# Patient Record
Sex: FEMALE | Race: WHITE | NOT HISPANIC OR LATINO | Employment: PART TIME | URBAN - METROPOLITAN AREA
[De-identification: names, ages, dates, MRNs, and addresses within clinical notes are randomized per-mention and may not be internally consistent; named-entity substitution may affect disease eponyms.]

---

## 2017-08-11 ENCOUNTER — OFFICE VISIT (OUTPATIENT)
Dept: INTERNAL MEDICINE | Facility: MEDICAL CENTER | Age: 27
End: 2017-08-11
Payer: COMMERCIAL

## 2017-08-11 VITALS
DIASTOLIC BLOOD PRESSURE: 73 MMHG | BODY MASS INDEX: 26.98 KG/M2 | HEIGHT: 64 IN | SYSTOLIC BLOOD PRESSURE: 109 MMHG | WEIGHT: 158 LBS | HEART RATE: 66 BPM | TEMPERATURE: 98.8 F | OXYGEN SATURATION: 95 %

## 2017-08-11 DIAGNOSIS — F06.4 ANXIETY DISORDER DUE TO GENERAL MEDICAL CONDITION: ICD-10-CM

## 2017-08-11 DIAGNOSIS — K21.9 GASTROESOPHAGEAL REFLUX DISEASE WITHOUT ESOPHAGITIS: ICD-10-CM

## 2017-08-11 DIAGNOSIS — Z00.00 HEALTHCARE MAINTENANCE: ICD-10-CM

## 2017-08-11 DIAGNOSIS — E05.90 HYPERTHYROIDISM: ICD-10-CM

## 2017-08-11 PROCEDURE — 99203 OFFICE O/P NEW LOW 30 MIN: CPT | Mod: GE | Performed by: INTERNAL MEDICINE

## 2017-08-11 RX ORDER — OMEPRAZOLE 20 MG/1
20 CAPSULE, DELAYED RELEASE ORAL DAILY
COMMUNITY
End: 2018-03-05

## 2017-08-11 RX ORDER — ESCITALOPRAM OXALATE 10 MG/1
10 TABLET ORAL DAILY
COMMUNITY
End: 2018-05-10

## 2017-08-11 RX ORDER — METHIMAZOLE 5 MG/1
10 TABLET ORAL DAILY
COMMUNITY
End: 2018-05-10

## 2017-08-11 RX ORDER — ALPRAZOLAM 0.25 MG/1
0.25 TABLET ORAL 2 TIMES DAILY
Status: CANCELLED | OUTPATIENT
Start: 2017-08-11

## 2017-08-11 RX ORDER — ALPRAZOLAM 0.25 MG/1
0.25 TABLET ORAL NIGHTLY PRN
COMMUNITY
End: 2018-05-10

## 2017-08-11 ASSESSMENT — PATIENT HEALTH QUESTIONNAIRE - PHQ9: CLINICAL INTERPRETATION OF PHQ2 SCORE: 0

## 2017-08-11 ASSESSMENT — PAIN SCALES - GENERAL: PAINLEVEL: NO PAIN

## 2017-08-11 NOTE — MR AVS SNAPSHOT
"        Alcira Encarnacion   2017 8:00 AM   Office Visit   MRN: [id number]    Department:  [facility] Med - Internal Med   Dept Phone:  [phone]    Description:  Female : 1990   Provider:  Hanny Kendrick M.D.           Reason for Visit     New Patient hyperthyroidism       Allergies as of 2017     No Known Allergies      You were diagnosed with     Hyperthyroidism   [654302]       Gastroesophageal reflux disease without esophagitis   [151522]       Anxiety disorder due to general medical condition   [2823720]         Vital Signs     Blood Pressure Pulse Temperature Height Weight Body Mass Index    109/73 mmHg 66 37.1 °C (98.8 °F) 1.626 m (5' 4.02\") 71.668 kg (158 lb) 27.11 kg/m2    Oxygen Saturation Last Menstrual Period Breastfeeding? Smoking Status          95% 2017 No Never Smoker         Basic Information     Date Of Birth Sex Race Ethnicity Preferred Language    1990 Female Unable to Obtain Unknown English      Your appointments     2018  8:00 AM   Established Patient with Hanny Kendrick M.D.   [facility] Med - Internal Medicine (--)    [address] [id number] [phone]           You will be receiving a confirmation call a few days before your appointment from our automated call confirmation system.              Problem List              ICD-10-CM Priority Class Noted - Resolved    Hyperthyroidism E05.90   2017 - Present    Acid reflux K21.9   2017 - Present      Health Maintenance     Patient has no pending health maintenance at this time      Current Immunizations     No immunizations on file.      Below and/or attached are the medications your provider expects you to take. Review all of your home medications and newly ordered medications with your provider and/or pharmacist. Follow medication instructions as directed by your provider and/or pharmacist. Please keep your medication list with you and share with your " provider. Update the information when medications are discontinued, doses are changed, or new medications (including over-the-counter products) are added; and carry medication information at all times in the event of emergency situations     Allergies:  No Known Allergies          Medications  Valid as of: August 11, 2017 -  9:13 AM    Generic Name Brand Name Tablet Size Instructions for use    ALPRAZolam (Tab) XANAX 0.25 MG Take 0.25 mg by mouth at bedtime as needed for Sleep.        Escitalopram Oxalate (Tab) LEXAPRO 10 MG Take 10 mg by mouth every day.        MethIMAzole (Tab) TAPAZOLE 10 MG Take 10 mg by mouth every day.        Omeprazole (CAPSULE DELAYED RELEASE) PRILOSEC 20 MG Take 20 mg by mouth every day.        .                 Medicines prescribed today were sent to:     [facility] [address], [place] - [address] [facility]    [address] [facility] [address]    Phone: [phone] Fax: [phone]    Open 24 Hours?: No      Medication refill instructions:       If your prescription bottle indicates you have medication refills left, it is not necessary to call your provider’s office. Please contact your pharmacy and they will refill your medication.    If your prescription bottle indicates you do not have any refills left, you may request refills at any time through one of the following ways: The online [employer] system (except Urgent Care), by calling your provider’s office, or by asking your pharmacy to contact your provider’s office with a refill request. Medication refills are processed only during regular business hours and may not be available until the next business day. Your provider may request additional information or to have a follow-up visit with you prior to refilling your medication.   *Please Note: Medication refills are assigned a new Rx number when refilled electronically. Your pharmacy may indicate that no refills were authorized even though a  new prescription for the same medication is available at the pharmacy. Please request the medicine by name with the pharmacy before contacting your provider for a refill.        Your To Do List     Future Labs/Procedures Complete By Expires    CBC WITH DIFFERENTIAL  As directed 8/11/2018    COMP METABOLIC PANEL  As directed 8/11/2018    HEPATIC FUNCTION PANEL  As directed 8/11/2018      Referral     A referral request has been sent to our patient care coordination department. Please allow 3-5 business days for us to process this request and contact you either by phone or mail. If you do not hear from us by the 5th business day, please call us at [phone].        Instructions    Follow up in 3 months.           [employer] Access Code: Activation code not generated  Current [employer] Status: Active

## 2017-08-11 NOTE — PROGRESS NOTES
"New Patient to Establish    Reason to establish: New patient to establish    CC: establish care.    HPI: Ms. Encarnacion is a 26 year old lady with PMHx significant for hyperthyroidism (type unknown) with secondary anxiety, and gastroesophageal reflux who presents today to establish care.  Patient has recently changed health insurance coverage and her new health insurance does not cover her previous PCP or endocrinologist.      Hyperthyroidism: Patient was diagnosed with hyperthyroidism in May 2016.  Was being followed by Dr. Erick Paige, Endocrinology, in [place] prior to change in health insurance.  Patient states her hyperthyroidism is well controlled on methimazole 10 mg daily.  Endorses occasional palpitations, unchanged.     Anxiety: Patient also experiences anxiety secondary to hyperthyroidism, for which she was followed by Dr. Paige and treated with escitalopram 10 mg daily and alprazolam 0.25 mg twice daily.  Patient reports anxiety is fairly well controlled except for when she abruptly discontinued the escitalopram.  She has since resumed it.      Gastroesophageal reflux: Patient states she had a long h/o \"abdominal pain and acid reflux\" after eating, which finally resolved when she was placed on omeprazole 20 mg daily five years ago.  She has since continued to take it with minimal symptoms.     Patient Active Problem List    Diagnosis Date Noted   • Hyperthyroidism 08/11/2017   • Acid reflux 08/11/2017       No past medical history on file.    Current Outpatient Prescriptions   Medication Sig Dispense Refill   • escitalopram (LEXAPRO) 10 MG Tab Take 10 mg by mouth every day.     • methimazole (TAPAZOLE) 10 MG Tab Take 10 mg by mouth every day.     • omeprazole (PRILOSEC) 20 MG delayed-release capsule Take 20 mg by mouth every day.     • alprazolam (XANAX) 0.25 MG Tab Take 0.25 mg by mouth at bedtime as needed for Sleep.       No current facility-administered medications for this visit.       Allergies " "as of 08/11/2017   • (No Known Allergies)       Social History     Social History   • Marital Status: Unknown     Spouse Name: N/A   • Number of Children: N/A   • Years of Education: N/A     Occupational History   • Not on file.     Social History Main Topics   • Smoking status: Never Smoker    • Smokeless tobacco: Never Used   • Alcohol Use: Yes      Comment: occasional    • Drug Use: No   • Sexual Activity: Not on file     Other Topics Concern   • Not on file     Social History Narrative   • No narrative on file       No family history on file.    No past surgical history on file.    ROS: 14 point review of systems negative except for below:    Constitutional:  Positive for 30 lb. weight gain since May 2016 after she began taking thyroid medication.    CV: Positive for occasional palpitations.   GI:  Positive for heartburn after she eats spicy foods and is not taking omeprazole.    Psych: Positive for occasional anxiety.        /73 mmHg  Pulse 66  Temp(Src) 37.1 °C (98.8 °F)  Ht 1.626 m (5' 4.02\")  Wt 71.668 kg (158 lb)  BMI 27.11 kg/m2  SpO2 95%  LMP 07/11/2017  Breastfeeding? No    Physical Exam  General:  Sitting in exam room in no apparent distress.  Alert and oriented to person, place, time, and situation.      Eyes: Pupils equal and reactive. No scleral icterus.    Throat: Clear no erythema or exudates noted.    Neck: Supple. No lymphadenopathy noted. Thyroid not enlarged.    Lungs: Clear to auscultation bilaterally.    Cardiovascular: Regular rate and rhythm. No murmurs, rubs or gallops.    Abdomen:  Soft, non-tender, non-distended.  No rebound or guarding noted.    Extremities: No clubbing, cyanosis, edema.    Skin: Clear. No rash or suspicious skin lesions noted.      Note: I have reviewed all pertinent labs and diagnostic tests associated with this visit with specific comments listed under the assessment and plan below    Assessment and Plan    1. Hyperthyroidism  - Diagnosed May 2016.  " "Previously followed by Dr. Erick Paige, Endocrinology, [place].  Needs new endocrinologist due to change in insurance.    - Patient currently asymptomatic except for occasional palpitations.    - Continue home medication: methimazole 10 mg daily.    - Labs: checking TSH/free T4, CBC w/diff, CMP, LFTs.    - Given referral to Endocrinology.      2. Anxiety disorder due to general medical condition  - Patient taking escitalopram 10 mg daily for baseline control of anxiety.    - Patient was also taking alprazolam 0.25 mg twice daily for anxiety.  Discussed with patient difference between long-acting/baseline control of anxiety with escitalopram and quick/short-acting alprazolam.  Advised patient alprazolam should be used on PRN basis rather than scheduled.  Patient agreed and \"[is] glad to not have to take one more pill.\"      3. Gastroesophageal reflux disease without esophagitis  - Patient reports years long history of abdominal pain and acid reflux, which resolved when she was started on omeprazole 20 mg by her PCP five years ago.  Denies work up for these symptoms.   - Discussed with patient possible long term effects of omeprazole including malabsorption, osteoporosis, and infections.  Patient agreeable to seeing a GI physician in the future for formal assessment for GERD and need for omeprazole.    4. Healthcare maintenance  - Patient's last Pap smear was two years ago.  Unsure if Pap or Pap/HPV.  Next screening in 1 year if Pap/HPV not confirmed.     Followup: Return in about 6 months (around 2/11/2018).    Signed by: Hanny Kendrick M.D.  "

## 2017-11-10 ENCOUNTER — APPOINTMENT (OUTPATIENT)
Dept: ENDOCRINOLOGY | Facility: MEDICAL CENTER | Age: 27
End: 2017-11-10

## 2018-03-05 ENCOUNTER — OFFICE VISIT (OUTPATIENT)
Dept: INTERNAL MEDICINE | Facility: MEDICAL CENTER | Age: 28
End: 2018-03-05
Payer: COMMERCIAL

## 2018-03-05 VITALS
DIASTOLIC BLOOD PRESSURE: 70 MMHG | SYSTOLIC BLOOD PRESSURE: 112 MMHG | HEART RATE: 87 BPM | BODY MASS INDEX: 27.45 KG/M2 | OXYGEN SATURATION: 93 % | WEIGHT: 160.8 LBS | TEMPERATURE: 98.4 F | HEIGHT: 64 IN

## 2018-03-05 DIAGNOSIS — F06.4 ANXIETY DISORDER DUE TO GENERAL MEDICAL CONDITION: ICD-10-CM

## 2018-03-05 DIAGNOSIS — K21.9 GASTROESOPHAGEAL REFLUX DISEASE WITHOUT ESOPHAGITIS: ICD-10-CM

## 2018-03-05 DIAGNOSIS — E05.90 HYPERTHYROIDISM: ICD-10-CM

## 2018-03-05 PROCEDURE — 99214 OFFICE O/P EST MOD 30 MIN: CPT | Mod: GC | Performed by: HOSPITALIST

## 2018-03-05 RX ORDER — RANITIDINE HCL 75 MG
75 TABLET ORAL 2 TIMES DAILY
Qty: 60 TAB | Refills: 3 | Status: SHIPPED | OUTPATIENT
Start: 2018-03-05 | End: 2018-05-10

## 2018-03-05 ASSESSMENT — PAIN SCALES - GENERAL: PAINLEVEL: NO PAIN

## 2018-03-05 NOTE — PROGRESS NOTES
Established Patient    Alcira presents today with the following:    CC: 6 month follow up.     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    Hyperthyroidism: Patient is followed by Dr. Vargas, Endocrinology. She states her methimazole was decreased from 10 mg to 5 mg recently. She had an appointment with Endocrinology last week during which she was told she may have had a slightly enlarged thyroid on visit. She reports Endocrinology will repeat her blood work in 6 weeks and will consider need for ultrasound. Denies symptoms of hyperthyroidism including palpitations, heat intolerance, increased anxiety, weight loss.    Anxiety: Patient states her anxiety, which is generally secondary to her hyperthyroidism, is doing well overall. She has a prescription for Xanax 0.25 mg nightly PRN, but states she only takes it 1-2 times per week. She is also on escitalopram 10 mg daily.    Gastroesophageal reflux: She states she has had zero symptoms of reflux in the past 6 months. She is agreeable to de-escalating her reflux medication to determine if it is still needed.        Patient Active Problem List    Diagnosis Date Noted   • Hyperthyroidism 08/11/2017   • Acid reflux 08/11/2017   • Anxiety disorder due to general medical condition 08/11/2017       Current Outpatient Prescriptions   Medication Sig Dispense Refill   • ranitidine (ZANTAC) 75 MG tablet Take 1 Tab by mouth 2 times a day. 60 Tab 3   • escitalopram (LEXAPRO) 10 MG Tab Take 10 mg by mouth every day.     • methimazole (TAPAZOLE) 5 MG Tab Take 10 mg by mouth every day.     • alprazolam (XANAX) 0.25 MG Tab Take 0.25 mg by mouth at bedtime as needed for Sleep.       No current facility-administered medications for this visit.        ROS: 12 point review of systems negative except as in HPI and  "below.     Psych: Positive for occasional anxiety.      /70   Pulse 87   Temp 36.9 °C (98.4 °F)   Ht 1.626 m (5' 4\")   Wt 72.9 kg (160 lb 12.8 oz)   LMP 02/27/2018   SpO2 93%   Breastfeeding? No   BMI 27.60 kg/m²     Physical Exam   Constitutional: Sitting in exam room chair, oriented to person, place, and time. No distress.   Eyes: Pupils are equal, round, and reactive to light. No scleral icterus.  Neck: Neck supple. Possible mild thyromegaly, no nodules palpated.  Cardiovascular: Normal rate, regular rhythm and normal heart sounds.  Exam reveals no gallop and no friction rub.  No murmur heard.  Pulmonary/Chest: Breath sounds normal. Chest wall is not dull to percussion.   Abdominal: Soft, nontender, nondistended. Bowel sounds present.  Musculoskeletal:   no edema.   Lymphadenopathy: no cervical adenopathy  Neurological: alert and oriented to person, place, and time.   Skin: No cyanosis. Nails show no clubbing.    PPI:  Reason for use? Dyspepsia and heartburn symptoms.   How long has patient been on it? 5.5 years.   Last time patient was off? Did symptoms resolve? Has not stopped since starting.  Alternative prescriptions attempted? None.  H. Pylori checked? Seen by GI? EGD in past? No/no/no.      Note: I have reviewed all pertinent labs and diagnostic tests associated with this visit with specific comments listed under the assessment and plan below    Assessment and Plan    1. Hyperthyroidism  2. Anxiety disorder due to general medical condition  - Followed by Dr. Vargas, Endocrinology.  Methimazole was decreased from 10 mg to 5 mg recently.  Had an Endocrinology appointment last week during which she was told she may have a slightly enlarged thyroid on visit.  Endocrinology will repeat her blood work in 6 weeks and will consider need for ultrasound.  Denies symptoms of hyperthyroidism including palpitations, heat intolerance, increased anxiety, weight loss.  - Patient's anxiety, which is generally " secondary to her hyperthyroidism, is doing well overall. She has a prescription for Xanax 0.25 mg nightly PRN, but states she only takes it 1-2 times per week.  She is also on escitalopram 10 mg daily.  - Does not appear to have recent LFTs, CMP, CBC. Was supposed to check 6 months ago, but the patient did not go.  Re-ordered.  Follow up.   - Patient to continue weaning off Xanax. She does not want to be dependent on it and is agreeable to trying to use it 0-1x/week.      3. Gastroesophageal reflux disease without esophagitis  -  Has been on omeprazole for follow-up in 5 years. She has had zero symptoms of reflux in the past 6 months.  She is agreeable to deescalation of reflux medication to determine if it is still needed.  - Change omeprazole to ranitidine 75 mg twice daily.    4. Healthcare maintenance  - Patient believes she is up to date on her immunizations. She received them in [place]. She will obtain these records and send them into the office.  - Patient's last Pap smear was 2 years ago. Next Pap smear in 2019.      Followup: Return in about 1 year (around 3/5/2019).      Signed by: Hanny Kendrick M.D.

## 2018-03-20 LAB
ALBUMIN SERPL-MCNC: 4.1 G/DL (ref 3.5–5.5)
ALBUMIN/GLOB SERPL: 1.5 {RATIO} (ref 1.2–2.2)
ALP SERPL-CCNC: 98 IU/L (ref 39–117)
ALT SERPL-CCNC: 13 IU/L (ref 0–32)
AST SERPL-CCNC: 18 IU/L (ref 0–40)
BILIRUB DIRECT SERPL-MCNC: 0.11 MG/DL (ref 0–0.4)
BILIRUB SERPL-MCNC: 0.3 MG/DL (ref 0–1.2)
BUN SERPL-MCNC: 16 MG/DL (ref 6–20)
BUN/CREAT SERPL: 25 (ref 9–23)
CALCIUM SERPL-MCNC: 9.2 MG/DL (ref 8.7–10.2)
CHLORIDE SERPL-SCNC: 101 MMOL/L (ref 96–106)
CO2 SERPL-SCNC: 21 MMOL/L (ref 18–29)
CREAT SERPL-MCNC: 0.64 MG/DL (ref 0.57–1)
ERYTHROCYTE [DISTWIDTH] IN BLOOD BY AUTOMATED COUNT: 13 % (ref 12.3–15.4)
GFR SERPLBLD CREATININE-BSD FMLA CKD-EPI: 124 ML/MIN/1.73
GFR SERPLBLD CREATININE-BSD FMLA CKD-EPI: 142 ML/MIN/1.73
GLOBULIN SER CALC-MCNC: 2.7 G/DL (ref 1.5–4.5)
GLUCOSE SERPL-MCNC: 87 MG/DL (ref 65–99)
HCT VFR BLD AUTO: 42.6 % (ref 34–46.6)
HGB BLD-MCNC: 14 G/DL (ref 11.1–15.9)
MCH RBC QN AUTO: 29 PG (ref 26.6–33)
MCHC RBC AUTO-ENTMCNC: 32.9 G/DL (ref 31.5–35.7)
MCV RBC AUTO: 88 FL (ref 79–97)
NRBC BLD AUTO-RTO: NORMAL %
PLATELET # BLD AUTO: 299 X10E3/UL (ref 150–379)
POTASSIUM SERPL-SCNC: 4.4 MMOL/L (ref 3.5–5.2)
PROT SERPL-MCNC: 6.8 G/DL (ref 6–8.5)
RBC # BLD AUTO: 4.83 X10E6/UL (ref 3.77–5.28)
SODIUM SERPL-SCNC: 138 MMOL/L (ref 134–144)
WBC # BLD AUTO: 7.4 X10E3/UL (ref 3.4–10.8)

## 2018-04-24 ENCOUNTER — TELEPHONE (OUTPATIENT)
Dept: INTERNAL MEDICINE | Facility: MEDICAL CENTER | Age: 28
End: 2018-04-24

## 2018-04-24 NOTE — TELEPHONE ENCOUNTER
1. Caller Name: patient                      Call Back Number: [phone] (home)       2. Message: patient has dry patches around her eye and would like to know what Dr. Kendrick think it could be from?      3. Patient approves office to leave a detailed voicemail/MyChart message: N\A

## 2018-04-26 NOTE — TELEPHONE ENCOUNTER
Spoke with patient re: below.  Patient no longer taking Xanax (had previously discontinued it).  Will be seeing Dr. Lagos tomorrow in [facility] clinic to further address possible medication changes.

## 2018-04-26 NOTE — TELEPHONE ENCOUNTER
Spoke to patient, she took 4 pregnancy test at home that are positive and waiting on confirmation blood test ordered by her Endo. Patient would like to speak to Dr. Kendrick in regards to her medications. She is currently on Lexapro and xanax and would like to know if it is safe to take during pregnancy or should she top them?

## 2018-04-27 ENCOUNTER — OFFICE VISIT (OUTPATIENT)
Dept: INTERNAL MEDICINE | Facility: MEDICAL CENTER | Age: 28
End: 2018-04-27
Payer: COMMERCIAL

## 2018-04-27 VITALS
BODY MASS INDEX: 26.63 KG/M2 | HEART RATE: 96 BPM | HEIGHT: 64 IN | OXYGEN SATURATION: 96 % | WEIGHT: 156 LBS | TEMPERATURE: 98.6 F | SYSTOLIC BLOOD PRESSURE: 109 MMHG | DIASTOLIC BLOOD PRESSURE: 78 MMHG

## 2018-04-27 DIAGNOSIS — F06.4 ANXIETY DISORDER DUE TO GENERAL MEDICAL CONDITION: ICD-10-CM

## 2018-04-27 DIAGNOSIS — K21.9 GASTROESOPHAGEAL REFLUX DISEASE WITHOUT ESOPHAGITIS: ICD-10-CM

## 2018-04-27 DIAGNOSIS — Z3A.01 LESS THAN 8 WEEKS GESTATION OF PREGNANCY: ICD-10-CM

## 2018-04-27 DIAGNOSIS — E05.90 HYPERTHYROIDISM: ICD-10-CM

## 2018-04-27 LAB
INT CON NEG: NEGATIVE
INT CON POS: POSITIVE
POC URINE PREGNANCY TEST: POSITIVE

## 2018-04-27 PROCEDURE — 81025 URINE PREGNANCY TEST: CPT | Mod: GE | Performed by: INTERNAL MEDICINE

## 2018-04-27 PROCEDURE — 99213 OFFICE O/P EST LOW 20 MIN: CPT | Mod: GE | Performed by: INTERNAL MEDICINE

## 2018-04-27 RX ORDER — METHIMAZOLE 10 MG/1
TABLET ORAL
COMMUNITY
Start: 2018-04-07 | End: 2018-05-10

## 2018-04-27 RX ORDER — OMEPRAZOLE 20 MG/1
CAPSULE, DELAYED RELEASE ORAL
COMMUNITY
Start: 2018-03-12 | End: 2018-05-10

## 2018-04-27 NOTE — PATIENT INSTRUCTIONS
Pregnancy  If you are planning on getting pregnant, it is a good idea to make a preconception appointment with your caregiver to discuss having a healthy lifestyle before getting pregnant. This includes diet, weight, exercise, taking prenatal vitamins (especially folic acid, which helps prevent brain and spinal cord defects), avoiding alcohol, smoking and illegal drugs, medical problems (diabetes, convulsions), family history of genetic problems, working conditions, and immunizations. It is better to have knowledge of these things and do something about them before getting pregnant.  During your pregnancy, it is important to follow certain guidelines in order to have a healthy baby. It is very important to get good prenatal care and follow your caregiver's instructions. Prenatal care includes all the medical care you receive before your baby's birth. This helps to prevent problems during the pregnancy and childbirth.  HOME CARE INSTRUCTIONS   · Start your prenatal visits by the 12th week of pregnancy or earlier, if possible. At first, appointments are usually scheduled monthly. They become more frequent in the last 2 months before delivery. It is important that you keep your caregiver's appointments and follow your caregiver's instructions regarding medication use, exercise, and diet.  · During pregnancy, you are providing food for you and your baby. Eat a regular, well-balanced diet. Choose foods such as meat, fish, milk and other dairy products, vegetables, fruits, whole-grain breads and cereals. Your caregiver will inform you of the ideal weight gain depending on your current height and weight. Drink lots of liquids. Try to drink 8 glasses of water a day.  · Alcohol is associated with a number of birth defects including fetal alcohol syndrome. It is best to avoid alcohol completely. Smoking will cause low birth rate and prematurity. Use of alcohol and nicotine during your pregnancy also increases the chances that  your child will be chemically dependent later in their life and may contribute to SIDS (Sudden Infant Death Syndrome).  · Do not use illegal drugs.  · Only take prescription or over-the-counter medications that are recommended by your caregiver. Other medications can cause genetic and physical problems in the baby.  · Morning sickness can often be helped by keeping soda crackers at the bedside. Eat a few before getting up in the morning.  · A sexual relationship may be continued until near the end of pregnancy if there are no other problems such as early (premature) leaking of amniotic fluid from the membranes, vaginal bleeding, painful intercourse or belly (abdominal) pain.  · Exercise regularly. Check with your caregiver if you are unsure of the safety of some of your exercises.  · Do not use hot tubs, steam rooms or saunas. These increase the risk of fainting and hurting yourself and the baby. Swimming is OK for exercise. Get plenty of rest, including afternoon naps when possible, especially in the third trimester.  · Avoid toxic odors and chemicals.  · Do not wear high heels. They may cause you to lose your balance and fall.  · Do not lift over 5 pounds. If you do lift anything, lift with your legs and thighs, not your back.  · Avoid long trips, especially in the third trimester.  · If you have to travel out of the city or state, take a copy of your medical records with you.  SEEK IMMEDIATE MEDICAL CARE IF:   · You develop an unexplained oral temperature above 102° F (38.9° C), or as your caregiver suggests.  · You have leaking of fluid from the vagina. If leaking membranes are suspected, take your temperature and inform your caregiver of this when you call.  · There is vaginal spotting or bleeding. Notify your caregiver of the amount and how many pads are used.  · You continue to feel sick to your stomach (nauseous) and have no relief from remedies suggested, or you throw up (vomit) blood or coffee ground like  materials.  · You develop upper abdominal pain.  · You have round ligament discomfort in the lower abdominal area. This still must be evaluated by your caregiver.  · You feel contractions of the uterus.  · You do not feel the baby move, or there is less movement than before.  · You have painful urination.  · You have abnormal vaginal discharge.  · You have persistent diarrhea.  · You get a severe headache.  · You have problems with your vision.  · You develop muscle weakness.  · You feel dizzy and faint.  · You develop shortness of breath.  · You develop chest pain.  · You have back pain that travels down to your leg and feet.  · You feel irregular or a very fast heartbeat.  · You develop excessive weight gain in a short period of time (5 pounds in 3 to 5 days).  · You are involved in a domestic violence situation.  Document Released: 12/18/2006 Document Revised: 06/18/2013 Document Reviewed: 06/11/2010  [employer]® Patient Information ©2014 [employer], [employer].

## 2018-04-27 NOTE — PROGRESS NOTES
Established Patient    Alcira presents today with the following:    CC: Amenorrhea for more than 8 weeks, home pregnancy test positive    HPI: 27-year-old Ms. Encarnacion with past medical history of anxiety, hypothyroidism presents to the clinic for the above reason.    Amenorrhea for 8 weeks: By mouth CT pregnancy test positive in the office. Last LMP was 3/15/18. She broke down into tears after hearing the news. It was not her planned pregnancy. She is having a kid with her boyfriend of 5 years. She is really anxious about the fact that she is pregnant. However she decided to keep the baby. She wants to know what she should do to control her stress and anxiety. She is no longer taking Xanax. She stopped taking his ecitalopram 2 days ago after she had positive pregnancy test at home. She is trying to use breathing techniques to control her anxiety. She feels extremely nauseous which could be due to pregnancy or due to her gerd. She feels cramping in the lower abdomen almost like she will have her period. But she denied any spotting since March 15.     Hyperthyroidism: She was on tapering dose of methimazole. She just saw her endocrinologist. She stopped taking methimazole few days ago. She supposed to do some lab work.     Anxiety: No longer on xanax or escitalopram. Feeling extremely anxious. Patient provided reassurance.     GERD: She is not taking ranitidine. Patient reassured that ranitidine or omeprazole doesn't have proven adverse effect on pregnancy. She can also take over-the-counter antacid, low risk for pregnancy.       Patient Active Problem List    Diagnosis Date Noted   • Less than 8 weeks gestation of pregnancy 04/27/2018   • Hyperthyroidism 08/11/2017   • Acid reflux 08/11/2017   • Anxiety disorder due to general medical condition 08/11/2017       Current Outpatient Prescriptions   Medication Sig Dispense Refill   • ranitidine (ZANTAC) 75 MG tablet Take 1 Tab by mouth 2 times a day. 60 Tab 3   •  "methimazole (TAPAZOLE) 10 MG Tab      • omeprazole (PRILOSEC) 20 MG delayed-release capsule      • escitalopram (LEXAPRO) 10 MG Tab Take 10 mg by mouth every day.     • methimazole (TAPAZOLE) 5 MG Tab Take 10 mg by mouth every day.     • alprazolam (XANAX) 0.25 MG Tab Take 0.25 mg by mouth at bedtime as needed for Sleep.       No current facility-administered medications for this visit.        ROS: As per HPI.     /78   Pulse 96   Temp 37 °C (98.6 °F)   Ht 1.626 m (5' 4.02\")   Wt 70.8 kg (156 lb)   SpO2 96%   BMI 26.76 kg/m²     Physical Exam   Constitutional: She is oriented to person, place, and time.   Anxious-looking young female, not in acute distress   HENT:   Head: Normocephalic and atraumatic.   Eyes: EOM are normal. Pupils are equal, round, and reactive to light.   Neck: Neck supple.   Cardiovascular: Regular rhythm, normal heart sounds and intact distal pulses.    No murmur heard.  Tachycardic   Pulmonary/Chest: Effort normal and breath sounds normal. No respiratory distress. She has no wheezes. She has no rales.   Abdominal: Soft. There is no tenderness. There is no rebound and no guarding.   Musculoskeletal: She exhibits no edema or deformity.   Neurological: She is alert and oriented to person, place, and time. Gait normal.   No gross motor or sensory deficit.         Note: I have reviewed all pertinent labs and diagnostic tests associated with this visit with specific comments listed under the assessment and plan below    Assessment and Plan    1. Less than 8 weeks gestation of pregnancy  POCT positive pregnancy test, Unplanned pregnancy  Last LMP 3/15/18, 6 weeks.  Not on xanax, escitalopram, ranitidine or methimazole.  Reassurance provided   Referral placed to ob/gyn urgent  Advised to take prenatal multivitamins.    2. Anxiety disorder due to general medical condition  Pt is getting extremely anxious after hearing the news that she is pregnant  No medicine ordered this visit. Discussed " about Buspar which is safer for pregnancy.  Ordered urgent psych referral for psychotherapy.  Advised for yoga, meditation, talking to her family members.    3. Hyperthyroidism  No longer taking methimazole.   Following endocrinologist.  Next visit in 6 weeks.     4. Acid reflux:   C/o extreme nausea which could be from pregnancy and worsening GERD  She is not taking omeprazole or ranitidine  Reassurance given that those medicine does not have proven harmful effect in pregnancy  She can take OTC antacid which also has low risk pregnancy.    Followup: No Follow-up on file.      Signed by: Bharat Lagos M.D.

## 2018-04-30 ENCOUNTER — PATIENT MESSAGE (OUTPATIENT)
Dept: INTERNAL MEDICINE | Facility: MEDICAL CENTER | Age: 28
End: 2018-04-30

## 2018-05-01 NOTE — TELEPHONE ENCOUNTER
From: Alcira Encarnacion  To: Hanny Kendrick M.D.  Sent: 4/30/2018 5:47 PM PDT  Subject: Non-Urgent Medical Question    Hi! I saw a doctor on Friday about my  Pregnancy. She gave me papers about my referrals and who she referred me too but on the paper it doesn’t say any names or locations or who she even referred me too. I got an OB but i need to find a psychiatrist. Is there any way you can figure out who she referred me to? Thank you.

## 2018-05-07 ENCOUNTER — TELEPHONE (OUTPATIENT)
Dept: INTERNAL MEDICINE | Facility: MEDICAL CENTER | Age: 28
End: 2018-05-07

## 2018-05-07 NOTE — TELEPHONE ENCOUNTER
1. Caller Name: patient                      Call Back Number: [phone] (home)       2. Message: patient spoke to Dr. Kendrick last week in regards to morning sickness and starting on vitamins but that isn't helping. Patient would like to know what to do?      3. Patient approves office to leave a detailed voicemail/MyChart message: N\A

## 2018-05-09 DIAGNOSIS — O21.9 NAUSEA/VOMITING IN PREGNANCY: ICD-10-CM

## 2018-05-09 RX ORDER — MECLIZINE HCL 12.5 MG/1
12.5 TABLET ORAL 3 TIMES DAILY PRN
Qty: 10 TAB | Refills: 0 | Status: SHIPPED | OUTPATIENT
Start: 2018-05-09 | End: 2018-06-11

## 2018-05-09 NOTE — TELEPHONE ENCOUNTER
Called patient and notified her of the medication being sent over to her pharmacy.     Patient also stated she is having anxiety and wanted to know what she needed to do about that.  I recommended for patient to make an appointment and be seen in our office.      Patient agreed and made appointment.

## 2018-05-10 ENCOUNTER — OFFICE VISIT (OUTPATIENT)
Dept: INTERNAL MEDICINE | Facility: MEDICAL CENTER | Age: 28
End: 2018-05-10
Payer: COMMERCIAL

## 2018-05-10 VITALS
OXYGEN SATURATION: 98 % | BODY MASS INDEX: 26.98 KG/M2 | HEIGHT: 64 IN | SYSTOLIC BLOOD PRESSURE: 104 MMHG | TEMPERATURE: 98 F | HEART RATE: 108 BPM | WEIGHT: 158 LBS | DIASTOLIC BLOOD PRESSURE: 82 MMHG

## 2018-05-10 DIAGNOSIS — E05.90 HYPERTHYROIDISM: ICD-10-CM

## 2018-05-10 DIAGNOSIS — F06.4 ANXIETY DISORDER DUE TO GENERAL MEDICAL CONDITION: ICD-10-CM

## 2018-05-10 DIAGNOSIS — Z3A.01 LESS THAN 8 WEEKS GESTATION OF PREGNANCY: ICD-10-CM

## 2018-05-10 PROCEDURE — 99213 OFFICE O/P EST LOW 20 MIN: CPT | Mod: GE | Performed by: INTERNAL MEDICINE

## 2018-05-11 NOTE — PROGRESS NOTES
"Established Patient    Alcira presents today with the following:    CC: 26 yo F with hx of hyperthyroidism and anxiety presents with anxiety.    HPI:      Anxiety disorder  - gets anxious about things, being pregnant, and how her fetus is doing. She would like someone to talk to. Denies depression, HI/SI  - saw psychology once. Kind of helping. She will see psychology more often.     Hyperthyroidism  She follows with Dr. Sam DING endocrinology. Per patient, she is getting close monitor with labs. She was recently taken off her methimazole.     Pregnancy  -taking prenatal vitamins.   - will follow up with GYN    Patient Active Problem List    Diagnosis Date Noted   • Less than 8 weeks gestation of pregnancy 04/27/2018   • Hyperthyroidism 08/11/2017   • Acid reflux 08/11/2017   • Anxiety disorder due to general medical condition 08/11/2017       Current Outpatient Prescriptions   Medication Sig Dispense Refill   • [id number] PO Take  by mouth.     • meclizine (ANTIVERT) 12.5 MG Tab Take 1 Tab by mouth 3 times a day as needed. 10 Tab 0     No current facility-administered medications for this visit.        Allergies:No Known Allergies    ROS  Constitutional: Denies fevers or chills  Eyes: Denies changes in vision  Ears/Nose/Throat/Mouth: Denies nasal congestion or sore throat   Cardiovascular: Denies chest pain or palpitations   Respiratory: Denies shortness of breath , Denies cough  Gastrointestinal/Hepatic: Denies abd pain, nausea, vomiting   Genitourinary: Denies dysuria or frequency  Musculoskeletal/Rheum: Denies joint pain and swelling   Neurological: Denies headache  Psychiatric: anxiety. Denies mood disorder   Endocrine: hyperthyroidism  Heme/Oncology/Lymph Nodes: Denies weight changes or enlarged LNs.    /82   Pulse (!) 108   Temp 36.7 °C (98 °F)   Ht 1.626 m (5' 4\")   Wt 71.7 kg (158 lb)   SpO2 98%   BMI 27.12 kg/m²     Physical Exam  General: non-toxic apeparnce. NAD.   HEENT: EOMI. " Scleral clear.  CVS: . Regular rhythm No m/r/g. No JVD.   PULM: CTABL . No w/r/r. No basilar crackles.   ABD: soft, NT, ND. +BS.   : No suprapubic tenderness. No CVA tenderness.   EXT: no LE edema b/l. No cyanosis.  Neuro: No focal deficits.   Pysch: AAOx4  Skin: no rashes.     Assessment and Plan    26 yo F with hx of hyperthyroidism and anxiety presents with anxiety     Anxiety disorder   - gets anxious about things, being pregnant, and how her fetus is doing. She would like someone to talk to. Denies depression, HI/SI  - saw psychology once. Kind of help. She will see psychology more often.  - REFERRAL TO PSYCHIATRY  - continue to follow with psychology  - follow with endocrinology for management of hyperthyroidism     Hyperthyroidism  Anxiety and slight tachycardic. No tremors.  She follows with Dr. Vargas [facility] endocrinology. Per patient, she is getting close monitor with labs. She was recently taken off her methimazole.   Encourage patient to get the labs done soon and follow with Dr. Vargas for hyperthyroid management.     Pregnancy  -taking prenatal vitamins.   - will follow up with GYN    Follow-up:Return if symptoms worsen or fail to improve.    Signed by: Washington Queen M.D.

## 2018-05-14 ENCOUNTER — HOSPITAL ENCOUNTER (EMERGENCY)
Facility: MEDICAL CENTER | Age: 28
End: 2018-05-14
Attending: EMERGENCY MEDICINE
Payer: COMMERCIAL

## 2018-05-14 ENCOUNTER — APPOINTMENT (OUTPATIENT)
Dept: RADIOLOGY | Facility: MEDICAL CENTER | Age: 28
End: 2018-05-14
Attending: EMERGENCY MEDICINE
Payer: COMMERCIAL

## 2018-05-14 VITALS
WEIGHT: 156.97 LBS | OXYGEN SATURATION: 98 % | BODY MASS INDEX: 26.8 KG/M2 | SYSTOLIC BLOOD PRESSURE: 119 MMHG | TEMPERATURE: 98.5 F | HEART RATE: 90 BPM | RESPIRATION RATE: 16 BRPM | DIASTOLIC BLOOD PRESSURE: 67 MMHG | HEIGHT: 64 IN

## 2018-05-14 DIAGNOSIS — Z3A.08 8 WEEKS GESTATION OF PREGNANCY: ICD-10-CM

## 2018-05-14 DIAGNOSIS — O41.8X10 SUBCHORIONIC HEMORRHAGE OF PLACENTA IN FIRST TRIMESTER, SINGLE OR UNSPECIFIED FETUS: ICD-10-CM

## 2018-05-14 DIAGNOSIS — R11.2 NON-INTRACTABLE VOMITING WITH NAUSEA, UNSPECIFIED VOMITING TYPE: ICD-10-CM

## 2018-05-14 DIAGNOSIS — O46.8X1 SUBCHORIONIC HEMORRHAGE OF PLACENTA IN FIRST TRIMESTER, SINGLE OR UNSPECIFIED FETUS: ICD-10-CM

## 2018-05-14 LAB
ANION GAP SERPL CALC-SCNC: 11 MMOL/L (ref 0–11.9)
APPEARANCE UR: CLEAR
B-HCG SERPL-ACNC: ABNORMAL MIU/ML (ref 0–5)
BACTERIA #/AREA URNS HPF: NEGATIVE /HPF
BASOPHILS # BLD AUTO: 0.5 % (ref 0–1.8)
BASOPHILS # BLD: 0.05 K/UL (ref 0–0.12)
BILIRUB UR QL STRIP.AUTO: NEGATIVE
BUN SERPL-MCNC: 7 MG/DL (ref 8–22)
CALCIUM SERPL-MCNC: 8.8 MG/DL (ref 8.5–10.5)
CHLORIDE SERPL-SCNC: 103 MMOL/L (ref 96–112)
CO2 SERPL-SCNC: 22 MMOL/L (ref 20–33)
COLOR UR: YELLOW
CREAT SERPL-MCNC: 0.5 MG/DL (ref 0.5–1.4)
EOSINOPHIL # BLD AUTO: 0.2 K/UL (ref 0–0.51)
EOSINOPHIL NFR BLD: 2 % (ref 0–6.9)
EPI CELLS #/AREA URNS HPF: ABNORMAL /HPF
ERYTHROCYTE [DISTWIDTH] IN BLOOD BY AUTOMATED COUNT: 39.8 FL (ref 35.9–50)
GLUCOSE SERPL-MCNC: 93 MG/DL (ref 65–99)
GLUCOSE UR STRIP.AUTO-MCNC: NEGATIVE MG/DL
HCT VFR BLD AUTO: 42.9 % (ref 37–47)
HGB BLD-MCNC: 14.1 G/DL (ref 12–16)
HYALINE CASTS #/AREA URNS LPF: ABNORMAL /LPF
IMM GRANULOCYTES # BLD AUTO: 0.04 K/UL (ref 0–0.11)
IMM GRANULOCYTES NFR BLD AUTO: 0.4 % (ref 0–0.9)
KETONES UR STRIP.AUTO-MCNC: 15 MG/DL
LEUKOCYTE ESTERASE UR QL STRIP.AUTO: ABNORMAL
LYMPHOCYTES # BLD AUTO: 2.27 K/UL (ref 1–4.8)
LYMPHOCYTES NFR BLD: 22.7 % (ref 22–41)
MCH RBC QN AUTO: 28.3 PG (ref 27–33)
MCHC RBC AUTO-ENTMCNC: 32.9 G/DL (ref 33.6–35)
MCV RBC AUTO: 86 FL (ref 81.4–97.8)
MICRO URNS: ABNORMAL
MONOCYTES # BLD AUTO: 0.43 K/UL (ref 0–0.85)
MONOCYTES NFR BLD AUTO: 4.3 % (ref 0–13.4)
NEUTROPHILS # BLD AUTO: 6.99 K/UL (ref 2–7.15)
NEUTROPHILS NFR BLD: 70.1 % (ref 44–72)
NITRITE UR QL STRIP.AUTO: NEGATIVE
NRBC # BLD AUTO: 0 K/UL
NRBC BLD-RTO: 0 /100 WBC
PH UR STRIP.AUTO: 5 [PH]
PLATELET # BLD AUTO: 318 K/UL (ref 164–446)
PMV BLD AUTO: 9.7 FL (ref 9–12.9)
POTASSIUM SERPL-SCNC: 3.5 MMOL/L (ref 3.6–5.5)
PROT UR QL STRIP: NEGATIVE MG/DL
RBC # BLD AUTO: 4.99 M/UL (ref 4.2–5.4)
RBC # URNS HPF: ABNORMAL /HPF
RBC UR QL AUTO: NEGATIVE
SODIUM SERPL-SCNC: 136 MMOL/L (ref 135–145)
SP GR UR STRIP.AUTO: 1.03
UROBILINOGEN UR STRIP.AUTO-MCNC: 0.2 MG/DL
WBC # BLD AUTO: 10 K/UL (ref 4.8–10.8)
WBC #/AREA URNS HPF: ABNORMAL /HPF

## 2018-05-14 PROCEDURE — 80048 BASIC METABOLIC PNL TOTAL CA: CPT

## 2018-05-14 PROCEDURE — 81001 URINALYSIS AUTO W/SCOPE: CPT

## 2018-05-14 PROCEDURE — 700105 HCHG RX REV CODE 258: Performed by: EMERGENCY MEDICINE

## 2018-05-14 PROCEDURE — 700111 HCHG RX REV CODE 636 W/ 250 OVERRIDE (IP): Performed by: EMERGENCY MEDICINE

## 2018-05-14 PROCEDURE — 99285 EMERGENCY DEPT VISIT HI MDM: CPT

## 2018-05-14 PROCEDURE — 84702 CHORIONIC GONADOTROPIN TEST: CPT

## 2018-05-14 PROCEDURE — 85025 COMPLETE CBC W/AUTO DIFF WBC: CPT

## 2018-05-14 PROCEDURE — 96374 THER/PROPH/DIAG INJ IV PUSH: CPT

## 2018-05-14 PROCEDURE — 76817 TRANSVAGINAL US OBSTETRIC: CPT

## 2018-05-14 RX ORDER — PYRIDOXINE HCL (VITAMIN B6) 25 MG
25 TABLET ORAL 3 TIMES DAILY PRN
Qty: 90 TAB | Refills: 0 | Status: SHIPPED | OUTPATIENT
Start: 2018-05-14 | End: 2018-06-11

## 2018-05-14 RX ORDER — METOCLOPRAMIDE 10 MG/1
10 TABLET ORAL 4 TIMES DAILY PRN
Qty: 40 TAB | Refills: 0 | Status: SHIPPED | OUTPATIENT
Start: 2018-05-14 | End: 2018-06-11

## 2018-05-14 RX ORDER — METOCLOPRAMIDE HYDROCHLORIDE 5 MG/ML
10 INJECTION INTRAMUSCULAR; INTRAVENOUS ONCE
Status: COMPLETED | OUTPATIENT
Start: 2018-05-14 | End: 2018-05-14

## 2018-05-14 RX ORDER — PYRIDOXINE HCL (VITAMIN B6) 25 MG
25 TABLET ORAL 3 TIMES DAILY
Status: DISCONTINUED | OUTPATIENT
Start: 2018-05-14 | End: 2018-05-14 | Stop reason: CLARIF

## 2018-05-14 RX ORDER — SODIUM CHLORIDE 9 MG/ML
1000 INJECTION, SOLUTION INTRAVENOUS ONCE
Status: COMPLETED | OUTPATIENT
Start: 2018-05-14 | End: 2018-05-14

## 2018-05-14 RX ADMIN — SODIUM CHLORIDE 1000 ML: 9 INJECTION, SOLUTION INTRAVENOUS at 17:25

## 2018-05-14 RX ADMIN — METOCLOPRAMIDE 10 MG: 5 INJECTION, SOLUTION INTRAMUSCULAR; INTRAVENOUS at 17:24

## 2018-05-14 NOTE — ED TRIAGE NOTES
".  Chief Complaint   Patient presents with   • Nausea     ./48   Pulse 77   Temp 37.1 °C (98.7 °F) (Temporal)   Resp 14   Ht 1.626 m (5' 4\")   Wt 71.2 kg (156 lb 15.5 oz)   SpO2 97%   BMI 26.94 kg/m²     Ambulatory to triage with above complaint, patient is 8 weeks pregnant, advised to come to ED by OB/GYN, denies vaginal bleeding, educated on triage process, placed in lobby, told to inform staff of any changes in condition.    "

## 2018-05-14 NOTE — ED PROVIDER NOTES
"ED Provider Note    Scribed for Candelario Chopra M.D. by Torri Durán. 2018, 4:39 PM.    Primary care provider: Hanny Kendrick M.D.  Means of arrival: walk in   History obtained from: patient   History limited by: none     CHIEF COMPLAINT  Chief Complaint   Patient presents with   • Nausea       HPI  Alcira Encarnacion is a 27 y.o. Female with a history of hypothyroidism who presents to the Emergency Department for evaluation of severe nausea onset several days ago. Patient reports associated vomiting, lightheadedness, and abdominal pain. She describes her abdominal pain as a dull aching sensation. Her symptoms are exacerbated with eating. She has been given Meclizine for symptom relief. Patient is approximately 8 weeks and 2 days pregnant and was advised by her OBGYN to be evaluated. Her pregnancy history is A0. She reports that she has not been taking her thyroid medications for 2 weeks. No complaints of syncope, vaginal bleeding, diarrhea, and dysuria.       REVIEW OF SYSTEMS  Pertinent positives include nausea, vomiting, lightheadedness, and abdominal pain. Pertinent negatives include syncope, vaginal bleeding, diarrhea, and dysuria. All other systems negative. C    PAST MEDICAL HISTORY    Anxiety, depression, hypothyroidism     SURGICAL HISTORY  patient denies any surgical history    SOCIAL HISTORY  Social History   Substance Use Topics   • Smoking status: Never Smoker   • Smokeless tobacco: Never Used   • Alcohol use No      History   Drug Use No       FAMILY HISTORY  No family history noted    CURRENT MEDICATIONS  Current medications can be reviewed in the nurse's note.     ALLERGIES  No Known Allergies    PHYSICAL EXAM  VITAL SIGNS: /48   Pulse 77   Temp 37.1 °C (98.7 °F) (Temporal)   Resp 14   Ht 1.626 m (5' 4\")   Wt 71.2 kg (156 lb 15.5 oz)   SpO2 97%   BMI 26.94 kg/m²     Constitutional: Well developed, Well nourished, mild distress.   HENT: Normocephalic, Atraumatic, Oropharynx " moist.   Eyes: Conjunctiva normal, No discharge.   Cardiovascular: Normal heart rate, Normal rhythm, No murmurs, equal pulses.   Pulmonary: Normal breath sounds, No respiratory distress, No wheezing, No rales, No rhonchi.  Abdomen:Soft, mild suprapubic tenderness, No masses, no rebound, no guarding.   Musculoskeletal: No major deformities noted. No edema in bilateral lower extremities.   Skin: Warm, Dry, No erythema, No rash.   Psychiatric: Affect normal, Judgment normal, Mood normal.       LABS  Results for orders placed or performed during the hospital encounter of 05/14/18   CBC WITH DIFFERENTIAL   Result Value Ref Range    WBC 10.0 4.8 - 10.8 K/uL    RBC 4.99 4.20 - 5.40 M/uL    Hemoglobin 14.1 12.0 - 16.0 g/dL    Hematocrit 42.9 37.0 - 47.0 %    MCV 86.0 81.4 - 97.8 fL    MCH 28.3 27.0 - 33.0 pg    MCHC 32.9 (L) 33.6 - 35.0 g/dL    RDW 39.8 35.9 - 50.0 fL    Platelet Count 318 164 - 446 K/uL    MPV 9.7 9.0 - 12.9 fL    Neutrophils-Polys 70.10 44.00 - 72.00 %    Lymphocytes 22.70 22.00 - 41.00 %    Monocytes 4.30 0.00 - 13.40 %    Eosinophils 2.00 0.00 - 6.90 %    Basophils 0.50 0.00 - 1.80 %    Immature Granulocytes 0.40 0.00 - 0.90 %    Nucleated RBC 0.00 /100 WBC    Neutrophils (Absolute) 6.99 2.00 - 7.15 K/uL    Lymphs (Absolute) 2.27 1.00 - 4.80 K/uL    Monos (Absolute) 0.43 0.00 - 0.85 K/uL    Eos (Absolute) 0.20 0.00 - 0.51 K/uL    Baso (Absolute) 0.05 0.00 - 0.12 K/uL    Immature Granulocytes (abs) 0.04 0.00 - 0.11 K/uL    NRBC (Absolute) 0.00 K/uL   BASIC METABOLIC PANEL   Result Value Ref Range    Sodium 136 135 - 145 mmol/L    Potassium 3.5 (L) 3.6 - 5.5 mmol/L    Chloride 103 96 - 112 mmol/L    Co2 22 20 - 33 mmol/L    Glucose 93 65 - 99 mg/dL    Bun 7 (L) 8 - 22 mg/dL    Creatinine 0.50 0.50 - 1.40 mg/dL    Calcium 8.8 8.5 - 10.5 mg/dL    Anion Gap 11.0 0.0 - 11.9   URINALYSIS,CULTURE IF INDICATED   Result Value Ref Range    Color Yellow     Character Clear     Specific Gravity 1.027 <1.035    Ph  5.0 5.0 - 8.0    Glucose Negative Negative mg/dL    Ketones 15 (A) Negative mg/dL    Protein Negative Negative mg/dL    Bilirubin Negative Negative    Urobilinogen, Urine 0.2 Negative    Nitrite Negative Negative    Leukocyte Esterase Small (A) Negative    Occult Blood Negative Negative    Micro Urine Req Microscopic    BETA-HCG QUANTITATIVE SERUM   Result Value Ref Range    Bhcg 648035.9 (H) 0.0 - 5.0 mIU/mL   URINE MICROSCOPIC (W/UA)   Result Value Ref Range    WBC 5-10 (A) /hpf    RBC 0-2 /hpf    Bacteria Negative None /hpf    Epithelial Cells Few /hpf    Hyaline Cast 3-5 (A) /lpf   ESTIMATED GFR   Result Value Ref Range    GFR If African American >60 >60 mL/min/1.73 m 2    GFR If Non African American >60 >60 mL/min/1.73 m 2     All labs reviewed by me.    RADIOLOGY  US-OB PELVIS TRANSVAGINAL   Final Result      Single live intrauterine pregnancy with estimated menstrual age of 8 weeks 1 day with evidence of subchorionic hemorrhage.      At least 3 simple appearing left ovarian cyst measuring up to 3 cm.                 The radiologist's interpretation of all radiological studies have been reviewed by me.    COURSE & MEDICAL DECISION MAKING  Pertinent Labs & Imaging studies reviewed. (See chart for details)    4:39 PM - Patient seen and examined at bedside. Patient will be treated with Reglan 10 mg. The patient will receive IV fluids for rehydration. Ordered beta-hcg quantitative serum, CBC, BMP, urinalysis, US-OB transvaginal to evaluate her symptoms. The differential diagnoses include but are not limited to: electrolyte abnormality, UTI, ectopic pregnancy, dehydration.    6:30 PM- Reviewed the patient's lab and imaging results which are shown above.     6:34 PM- Patient was reevaluated at bedside. Discussed lab and radiology results with the patient and informed them that she is stable for discharged. Recommended the patient get B6 vitamins and Reglan daily for symptom relief. Encouraged her to hydrate with  plenty of fluids. Discussed strict return precautions with the patient. She agrees to be discharged home.      There was a good response to IV fluids after patient reevaluation.    Medical Decision Making: At this point time I think the patient's abdominal cramping may be secondary to a small subchorionic hemorrhage.  Patient does not have any vaginal bleeding.  Rh factor is positive therefore she is not a candidate for program.  The patient will return for new or worsening symptoms and is stable at the time of discharge.    Patient's blood pressure was elevated, I believe it is likely secondary to medical condition. However I have advised home monitoring and if it continues to be 120/80 or higher, advised to follow up with primary care physician for blood pressure management.     DISPOSITION:  Patient will be discharged home in stable condition.    FOLLOW UP:  MYRON Bryant  [address] [place] [id number]  [phone]    Schedule an appointment as soon as possible for a visit in 1 week        OUTPATIENT MEDICATIONS:  Discharge Medication List as of 5/14/2018  6:59 PM      START taking these medications    Details   pyridoxine (VITAMIN B-6) 25 MG Tab Take 1 Tab by mouth 3 times a day as needed ([place]) for up to 30 days., Disp-90 Tab, R-0, Print Rx Paper      metoclopramide (REGLAN) 10 MG Tab Take 1 Tab by mouth 4 times a day as needed (Nausea)., Disp-40 Tab, R-0, Print Rx Paper             FINAL IMPRESSION  1. Non-intractable vomiting with nausea, unspecified vomiting type    2. Subchorionic hemorrhage of placenta in first trimester, single or unspecified fetus    3. 8 weeks gestation of pregnancy        Torri CHE (Kasandra), am scribing for, and in the presence of, Candelario Chopra M.D.    Electronically signed by: Torri Durán (Kasandra), 5/14/2018    Candelario CHE M.D. personally performed the services described in this documentation, as scribed by Torri Durán in my  presence, and it is both accurate and complete.    The note accurately reflects work and decisions made by me.  Candelario Chopra  5/15/2018  12:24 AM

## 2018-05-15 NOTE — ED NOTES
Patient has been provided written and verbal education for morning sickness and subchorionic hemorrhage. She has been educated on worsening s/s including increased pain and heavy bleeding. All Dc instruction discussed including prescriptions. Pt verbalized understanding. IV removed prior to DC.

## 2018-05-15 NOTE — DISCHARGE INSTRUCTIONS
Return if you have severe vomiting, fever, abdominal pain, or heavy vaginal bleeding greater than a pad an hour.     Morning Sickness  Morning sickness is when you feel sick to your stomach (nauseous) during pregnancy. You may feel sick to your stomach and throw up (vomit). You may feel sick in the morning, but you can feel this way any time of day. Some women feel very sick to their stomach and cannot stop throwing up (hyperemesis gravidarum).  Follow these instructions at home:  · Only take medicines as told by your doctor.  · Take multivitamins as told by your doctor. Taking multivitamins before getting pregnant can stop or lessen the harshness of morning sickness.  · Eat dry toast or unsalted crackers before getting out of bed.  · Eat 5 to 6 small meals a day.  · Eat dry and bland foods like rice and baked potatoes.  · Do not drink liquids with meals. Drink between meals.  · Do not eat greasy, fatty, or spicy foods.  · Have someone cook for you if the smell of food causes you to feel sick or throw up.  · If you feel sick to your stomach after taking prenatal vitamins, take them at night or with a snack.  · Eat protein when you need a snack (nuts, yogurt, cheese).  · Eat unsweetened gelatins for dessert.  · Wear a bracelet used for sea sickness (acupressure wristband).  · Go to a doctor that puts thin needles into certain body points (acupuncture) to improve how you feel.  · Do not smoke.  · Use a humidifier to keep the air in your house free of odors.  · Get lots of fresh air.  Contact a doctor if:  · You need medicine to feel better.  · You feel dizzy or lightheaded.  · You are losing weight.  Get help right away if:  · You feel very sick to your stomach and cannot stop throwing up.  · You pass out (faint).  This information is not intended to replace advice given to you by your health care provider. Make sure you discuss any questions you have with your health care provider.  Document Released: 01/25/2006  Document Revised: 2017 Document Reviewed: 2014  [employer] Interactive Patient Education © 2017 [employer]        Subchorionic Hematoma  A subchorionic hematoma is a gathering of blood between the outer wall of the placenta and the inner wall of the womb (uterus). The placenta is the organ that connects the fetus to the wall of the uterus. The placenta performs the feeding, breathing (oxygen to the fetus), and waste removal (excretory work) of the fetus.   Subchorionic hematoma is the most common abnormality found on a result from ultrasonography done during the first trimester or early second trimester of pregnancy. If there has been little or no vaginal bleeding, early small hematomas usually shrink on their own and do not affect your baby or pregnancy. The blood is gradually absorbed over 1-2 weeks. When bleeding starts later in pregnancy or the hematoma is larger or occurs in an older pregnant woman, the outcome may not be as good. Larger hematomas may get bigger, which increases the chances for miscarriage. Subchorionic hematoma also increases the risk of premature detachment of the placenta from the uterus,  (premature) labor, and stillbirth.  HOME CARE INSTRUCTIONS  · Stay on bed rest if your health care provider recommends this. Although bed rest will not prevent more bleeding or prevent a miscarriage, your health care provider may recommend bed rest until you are advised otherwise.  · Avoid heavy lifting (more than 10 lb [4.5 kg]), exercise, sexual intercourse, or douching as directed by your health care provider.  · Keep track of the number of pads you use each day and how soaked (saturated) they are. Write down this information.  · Do not use tampons.  · Keep all follow-up appointments as directed by your health care provider. Your health care provider may ask you to have follow-up blood tests or ultrasound tests or both.  SEEK IMMEDIATE MEDICAL CARE IF:  · You have severe cramps in  your stomach, back, abdomen, or pelvis.  · You have a fever.  · You pass large clots or tissue. Save any tissue for your health care provider to look at.  · Your bleeding increases or you become lightheaded, feel weak, or have fainting episodes.  This information is not intended to replace advice given to you by your health care provider. Make sure you discuss any questions you have with your health care provider.  Document Released: 04/03/2008 Document Revised: 01/08/2016 Document Reviewed: 07/17/2014  [employer] Interactive Patient Education © 2017 [employer]        Nausea and Vomiting, Adult  Introduction  Feeling sick to your stomach (nausea) means that your stomach is upset or you feel like you have to throw up (vomit). Feeling more and more sick to your stomach can lead to throwing up. Throwing up happens when food and liquid from your stomach are thrown up and out the mouth. Throwing up can make you feel weak and cause you to get dehydrated. Dehydration can make you tired and thirsty, make you have a dry mouth, and make it so you pee (urinate) less often. Older adults and people with other diseases or a weak defense system (immune system) are at higher risk for dehydration. If you feel sick to your stomach or if you throw up, it is important to follow instructions from your doctor about how to take care of yourself.  Follow these instructions at home:  Eating and drinking  Follow these instructions as told by your doctor:  · Take an oral rehydration solution (ORS). This is a drink that is sold at pharmacies and stores.  · Drink clear fluids in small amounts as you are able, such as:  ¨ Water.  ¨ Ice chips.  ¨ Diluted fruit juice.  ¨ Low-calorie sports drinks.  · Eat bland, easy-to-digest foods in small amounts as you are able, such as:  ¨ Bananas.  ¨ Applesauce.  ¨ Rice.  ¨ Low-fat (lean) meats.  ¨ Toast.  ¨ Crackers.  · Avoid fluids that have a lot of sugar or caffeine in them.  · Avoid alcohol.  · Avoid  spicy or fatty foods.  General instructions  · Drink enough fluid to keep your pee (urine) clear or pale yellow.  · Wash your hands often. If you cannot use soap and water, use hand .  · Make sure that all people in your home wash their hands well and often.  · Take over-the-counter and prescription medicines only as told by your doctor.  · Rest at home while you get better.  · Watch your condition for any changes.  · Breathe slowly and deeply when you feel sick to your stomach.  · Keep all follow-up visits as told by your doctor. This is important.  Contact a doctor if:  · You have a fever.  · You cannot keep fluids down.  · Your symptoms get worse.  · You have new symptoms.  · You feel sick to your stomach for more than two days.  · You feel light-headed or dizzy.  · You have a headache.  · You have muscle cramps.  Get help right away if:  · You have pain in your chest, neck, arm, or jaw.  · You feel very weak or you pass out (faint).  · You throw up again and again.  · You see blood in your throw-up.  · Your throw-up looks like black coffee grounds.  · You have bloody or black poop (stools) or poop that look like tar.  · You have a very bad headache, a stiff neck, or both.  · You have a rash.  · You have very bad pain, cramping, or bloating in your belly (abdomen).  · You have trouble breathing.  · You are breathing very quickly.  · Your heart is beating very quickly.  · Your skin feels cold and clammy.  · You feel confused.  · You have pain when you pee.  · You have signs of dehydration, such as:  ¨ Dark pee, hardly any pee, or no pee.  ¨ Cracked lips.  ¨ Dry mouth.  ¨ Sunken eyes.  ¨ Sleepiness.  ¨ Weakness.  These symptoms may be an emergency. Do not wait to see if the symptoms will go away. Get medical help right away. Call your local emergency services (911 in the [place] Do not drive yourself to the hospital.   This information is not intended to replace advice given to you by your health care  provider. Make sure you discuss any questions you have with your health care provider.  Document Released: 06/05/2009 Document Revised: 07/07/2017 Document Reviewed: 08/23/2016  © 2017 [employer]

## 2018-05-25 ENCOUNTER — PATIENT MESSAGE (OUTPATIENT)
Dept: INTERNAL MEDICINE | Facility: MEDICAL CENTER | Age: 28
End: 2018-05-25

## 2018-05-25 NOTE — TELEPHONE ENCOUNTER
From: Alcira Encarnacion  To: Hanny Kendrick M.D.  Sent: 5/25/2018 2:33 PM PDT  Subject: Non-Urgent Medical Question    I need to talk to Dr. Kendrick regarding birth control and certain ones to go on the wohkd be good for me. If someone can message me back or give me a call.. I can’t get in to see her as there’s no available dates for next month. Thank you

## 2018-06-04 NOTE — TELEPHONE ENCOUNTER
Spoke to patient, transferred her up front to make an appt with Dr. Kendrick so she can talk to her about birth control.  Offered her a sooner appt but she would like to wait to see her PCP.

## 2018-06-11 ENCOUNTER — OFFICE VISIT (OUTPATIENT)
Dept: INTERNAL MEDICINE | Facility: MEDICAL CENTER | Age: 28
End: 2018-06-11
Payer: COMMERCIAL

## 2018-06-11 VITALS
DIASTOLIC BLOOD PRESSURE: 78 MMHG | HEIGHT: 64 IN | BODY MASS INDEX: 26.63 KG/M2 | WEIGHT: 156 LBS | TEMPERATURE: 99 F | OXYGEN SATURATION: 95 % | SYSTOLIC BLOOD PRESSURE: 132 MMHG

## 2018-06-11 DIAGNOSIS — F33.9 RECURRENT MAJOR DEPRESSIVE DISORDER, REMISSION STATUS UNSPECIFIED (HCC): ICD-10-CM

## 2018-06-11 DIAGNOSIS — E05.90 HYPERTHYROIDISM: ICD-10-CM

## 2018-06-11 DIAGNOSIS — F06.4 ANXIETY DISORDER DUE TO GENERAL MEDICAL CONDITION: ICD-10-CM

## 2018-06-11 PROBLEM — F32.9 MAJOR DEPRESSIVE DISORDER: Status: ACTIVE | Noted: 2018-06-11

## 2018-06-11 PROCEDURE — 93000 ELECTROCARDIOGRAM COMPLETE: CPT | Mod: GC | Performed by: INTERNAL MEDICINE

## 2018-06-11 PROCEDURE — 99214 OFFICE O/P EST MOD 30 MIN: CPT | Mod: GC | Performed by: INTERNAL MEDICINE

## 2018-06-11 RX ORDER — SERTRALINE HYDROCHLORIDE 25 MG/1
25 TABLET, FILM COATED ORAL DAILY
Qty: 30 TAB | Refills: 11 | Status: SHIPPED | OUTPATIENT
Start: 2018-06-11 | End: 2018-06-21

## 2018-06-11 RX ORDER — ESCITALOPRAM OXALATE 10 MG/1
10 TABLET ORAL DAILY
COMMUNITY
End: 2018-06-11

## 2018-06-11 RX ORDER — ALPRAZOLAM 0.25 MG/1
0.25 TABLET ORAL NIGHTLY PRN
COMMUNITY
End: 2018-07-05

## 2018-06-11 ASSESSMENT — PAIN SCALES - GENERAL: PAINLEVEL: NO PAIN

## 2018-06-11 NOTE — TELEPHONE ENCOUNTER
Received call from [facility].  Pt called in to get appt for refill, stated she was experiencing anxiety with suicidal thoughts, per protocol we offered to call 911, pt refused, wanted to come to office.  Has appt this afternoon with Anderson.

## 2018-06-11 NOTE — PROGRESS NOTES
Established Patient    Alcira presents today with the following:    CC: Acute visit due to more depressed and anxious      HPI:   History was obtained from the patient and a medical record review.   BF also provided some history.     27 yr old F with Hx of Graves, recent pregnant with , depression and anxiety presented to our clinic for worsening depression and anxiety. The patient did not have a plan to hurt anybody including herself. She confirmed multiple times, she will not hurt anybody including herself. The patient also has unstable thyroid function, her last TSH was 0.8, which was normal. The patient is worried if her unstable mood is due to recent  or drug effect or unstable thyroid function.   We spent >30mins with her, confirmed no suicidal idea/plan now, and explained why we do not want to provide BZD at this moment. We are going to switch antidepressants as pt reports she thinks Lexapro is making her more anxious.  Also, pt has been getting BZDs from a Dr. Bernal.  Reviewed .  Pt reports that she took a few in last week but other than that had not take any for months.  She reported that she thought we were writing her for them and not Dr. Bernal.  The patient is going to see her therapist (Psychology) today in the afternoon; this IM team will provide help if her Psychologist recommends anything. The patient has HR up to 90 in office, EKG showed sinus, rate at 80.             Patient Active Problem List    Diagnosis Date Noted   • Major depressive disorder 2018   • Less than 8 weeks gestation of pregnancy 2018   • Hyperthyroidism 2017   • Acid reflux 2017   • Anxiety disorder due to general medical condition 2017       Current Outpatient Prescriptions   Medication Sig Dispense Refill   • escitalopram (LEXAPRO) 10 MG Tab Take 10 mg by mouth every day.     • ALPRAZolam (XANAX) 0.25 MG Tab Take 0.25 mg by mouth at bedtime as needed for Sleep.       No  "current facility-administered medications for this visit.        ROS: As per HPI. Additional pertinent symptoms as noted below.  Constitutional: Denies fevers.  Eyes: Denies changes in vision, no eye pain  Ears/Nose/Throat/Mouth: Denies nasal congestion or sore throat   Cardiovascular: Denies chest pain or palpitations   Respiratory: Denies shortness of breath , Denies cough  Gastrointestinal/Hepatic: Denies abdominal pain, nausea, vomiting, or diarrhea   Genitourinary: Denies bladder dysfunction, dysuria or frequency  Musculoskeletal/Rheum: No complaints   Skin/Breast: Denies rash   Neurological: Denies headache. Denied focal weakness/parasthesias  Psychiatric: Do no feel like myself. More stressed and depressed.    All other systems were reviewed and are negative (AMA/CMS criteria)      /78   Temp 37.2 °C (99 °F)   Ht 1.626 m (5' 4\")   Wt 70.8 kg (156 lb)   SpO2 95%   Breastfeeding? No   BMI 26.78 kg/m²     Physical Exam   HEENT: grossly normal   Cardiovascular: Normal heart rate, Normal rhythm   Lungs: Respiratory effort is normal. Normal breath sounds  Abdomen: Bowel sounds normal, Soft, No tenderness  Skin: No erythema, No rash  Lower limbs: normal, no pitting edema   Neurologic: Alert & oriented x 3, Normal motor function, Normal sensory function, No focal deficits noted, cranial nerves II through XII are normal  PSY: Stressed per the patient. Confirmed by both Resident and Attending, NO SI, NO Plan, NO intention to hurt anybody.   Other systems also examined, grossly normal.       Note: I have reviewed all pertinent labs and diagnostic tests associated with this visit with specific comments listed under the assessment and plan below    Assessment and Plan  Problem List Items Addressed This Visit     Hyperthyroidism  - Was on methimazole. No lab we have here. Waiting for [facility].   - Will still recheck her TSH. Her heart rate has been around , she might need to resume methimazole. EKG is " "sinus with rate at 80 reviewed by resident    Relevant Orders    TSH WITH REFLEX TO FT4    COMP METABOLIC PANEL    CBC WITH DIFFERENTIAL    EKG - Clinic performed    Anxiety disorder due to general medical condition  Major depressive disorder  - Per the patient, she gets stressed out, panic attack and feel depressed this kind package. Recently, she resume her Lexapro (10 days ago), but felt worse this time.   - Long discussion with BZD, Anti-depression, we decide to change his Lexapro to Zoloft. She will talke with her Psychologist also today.   - Her boyfriend will be with her, nearly 24 hours a day. No SI or HI or plan now.    Will not write her for BZD; the report that we were writing for BZD and not Dr. Bernal is contrary to what is in the chart here.  Also, pt reports that besides last week she had not taken for weeks which is also inconsistent with fill patterns reviewed on .  If anxiolytic needed, consider Buspar    Relevant Medications    sertraline (ZOLOFT) 25 MG tablet    Other Relevant Orders    TSH WITH REFLEX TO FT4        Relevant Medications    ALPRAZolam (XANAX) 0.25 MG Tab    sertraline (ZOLOFT) 25 MG tablet        Also, vag US and beta hcg s/o pregnancy May 2018.  Offered her a pregnancy test.  She is declining this aware of risk . She says that she saw OB GYN who \"removed\" the fetus.      Followup: Will see Dr. Kendrick next week    Signed by: Tiffany Anderson M.D.    "

## 2018-06-13 ENCOUNTER — TELEPHONE (OUTPATIENT)
Dept: INTERNAL MEDICINE | Facility: MEDICAL CENTER | Age: 28
End: 2018-06-13

## 2018-06-13 NOTE — TELEPHONE ENCOUNTER
DOCUMENTATION OF PAR STATUS:    1. Name of Medication & Dose: Sertraline 25 mg 1 po qd      2. Name of Prescription Coverage Company & phone #: [employer] fax: [phone]    3. Date Prior Auth Submitted: 6/13/18    4. What information was given to obtain insurance decision? See PAR form faxed     5. Prior Auth Letter Approved or Denied? No auth is needed pt was refilling early     6. Action Taken: Pharmacy/Patient Notified: called pharmacy to verify pt already picked up med

## 2018-06-13 NOTE — PROGRESS NOTES
Discussed this patient's situation/condition on phone with her primary PSY therapy Dr Alcira Cavazos on 06/13.   Dr. Cavazos understands why we are not providing BZD.   Dr. Cavazos will see her every weeks. Will keep up posted if any modification needed.

## 2018-06-16 LAB
ALBUMIN SERPL-MCNC: 4.1 G/DL (ref 3.5–5.5)
ALBUMIN/GLOB SERPL: 1.6 {RATIO} (ref 1.2–2.2)
ALP SERPL-CCNC: 97 IU/L (ref 39–117)
ALT SERPL-CCNC: 12 IU/L (ref 0–32)
AST SERPL-CCNC: 17 IU/L (ref 0–40)
BASOPHILS # BLD AUTO: 0 X10E3/UL (ref 0–0.2)
BASOPHILS NFR BLD AUTO: 0 %
BILIRUB SERPL-MCNC: 0.4 MG/DL (ref 0–1.2)
BUN SERPL-MCNC: 10 MG/DL (ref 6–20)
BUN/CREAT SERPL: 18 (ref 9–23)
CALCIUM SERPL-MCNC: 9.1 MG/DL (ref 8.7–10.2)
CHLORIDE SERPL-SCNC: 106 MMOL/L (ref 96–106)
CO2 SERPL-SCNC: 18 MMOL/L (ref 20–29)
CREAT SERPL-MCNC: 0.57 MG/DL (ref 0.57–1)
EOSINOPHIL # BLD AUTO: 0.2 X10E3/UL (ref 0–0.4)
EOSINOPHIL NFR BLD AUTO: 3 %
ERYTHROCYTE [DISTWIDTH] IN BLOOD BY AUTOMATED COUNT: 12.9 % (ref 12.3–15.4)
GFR SERPLBLD CREATININE-BSD FMLA CKD-EPI: 128 ML/MIN/1.73
GFR SERPLBLD CREATININE-BSD FMLA CKD-EPI: 148 ML/MIN/1.73
GLOBULIN SER CALC-MCNC: 2.6 G/DL (ref 1.5–4.5)
GLUCOSE SERPL-MCNC: 82 MG/DL (ref 65–99)
HCT VFR BLD AUTO: 42.8 % (ref 34–46.6)
HGB BLD-MCNC: 14.5 G/DL (ref 11.1–15.9)
IMM GRANULOCYTES # BLD: 0 X10E3/UL (ref 0–0.1)
IMM GRANULOCYTES NFR BLD: 0 %
IMMATURE CELLS  115398: NORMAL
LYMPHOCYTES # BLD AUTO: 2.4 X10E3/UL (ref 0.7–3.1)
LYMPHOCYTES NFR BLD AUTO: 41 %
MCH RBC QN AUTO: 29.2 PG (ref 26.6–33)
MCHC RBC AUTO-ENTMCNC: 33.9 G/DL (ref 31.5–35.7)
MCV RBC AUTO: 86 FL (ref 79–97)
MONOCYTES # BLD AUTO: 0.7 X10E3/UL (ref 0.1–0.9)
MONOCYTES NFR BLD AUTO: 12 %
MORPHOLOGY BLD-IMP: NORMAL
NEUTROPHILS # BLD AUTO: 2.6 X10E3/UL (ref 1.4–7)
NEUTROPHILS NFR BLD AUTO: 44 %
NRBC BLD AUTO-RTO: NORMAL %
PLATELET # BLD AUTO: 294 X10E3/UL (ref 150–379)
POTASSIUM SERPL-SCNC: 4 MMOL/L (ref 3.5–5.2)
PROT SERPL-MCNC: 6.7 G/DL (ref 6–8.5)
RBC # BLD AUTO: 4.96 X10E6/UL (ref 3.77–5.28)
SODIUM SERPL-SCNC: 140 MMOL/L (ref 134–144)
TSH SERPL DL<=0.005 MIU/L-ACNC: 1.32 UIU/ML (ref 0.45–4.5)
WBC # BLD AUTO: 5.9 X10E3/UL (ref 3.4–10.8)

## 2018-06-19 ENCOUNTER — TELEPHONE (OUTPATIENT)
Dept: INTERNAL MEDICINE | Facility: MEDICAL CENTER | Age: 28
End: 2018-06-19

## 2018-06-19 NOTE — TELEPHONE ENCOUNTER
1. Caller Name: patient                      Call Back Number: [phone] (home)       2. Message: left msg to call back.     Spoke to patient, she started zoloft a week ago and wanted to know if she can have really bad anxiety from it?  She went to work today and had ti leave due to anxiety and panic attacks.      3. Patient approves office to leave a detailed voicemail/MyChart message: N\A

## 2018-06-20 NOTE — TELEPHONE ENCOUNTER
Check with Dr. Kendrick to see if she was able to speak to patient. She tried but no answer.     I called and spoke to patient, inform her that Dr would be trying to call her again.

## 2018-06-21 ENCOUNTER — OFFICE VISIT (OUTPATIENT)
Dept: INTERNAL MEDICINE | Facility: MEDICAL CENTER | Age: 28
End: 2018-06-21
Payer: COMMERCIAL

## 2018-06-21 VITALS
OXYGEN SATURATION: 95 % | DIASTOLIC BLOOD PRESSURE: 88 MMHG | HEART RATE: 92 BPM | WEIGHT: 154.2 LBS | TEMPERATURE: 97.9 F | HEIGHT: 64 IN | SYSTOLIC BLOOD PRESSURE: 110 MMHG | BODY MASS INDEX: 26.32 KG/M2

## 2018-06-21 DIAGNOSIS — F33.9 RECURRENT MAJOR DEPRESSIVE DISORDER, REMISSION STATUS UNSPECIFIED (HCC): ICD-10-CM

## 2018-06-21 DIAGNOSIS — E05.00 GRAVES DISEASE: ICD-10-CM

## 2018-06-21 DIAGNOSIS — F06.4 ANXIETY DISORDER DUE TO GENERAL MEDICAL CONDITION: ICD-10-CM

## 2018-06-21 PROBLEM — Z3A.01 LESS THAN 8 WEEKS GESTATION OF PREGNANCY: Status: RESOLVED | Noted: 2018-04-27 | Resolved: 2018-06-21

## 2018-06-21 PROCEDURE — 99214 OFFICE O/P EST MOD 30 MIN: CPT | Mod: GC | Performed by: INTERNAL MEDICINE

## 2018-06-21 RX ORDER — BUSPIRONE HYDROCHLORIDE 10 MG/1
10 TABLET ORAL 2 TIMES DAILY
Qty: 60 TAB | Refills: 1 | Status: SHIPPED | OUTPATIENT
Start: 2018-06-21

## 2018-06-21 NOTE — PROGRESS NOTES
Established Patient    Alcira presents today with the following:    CC: Anxiety    HPI:     27 old female presenting with:    Anxiety: Patient has a long-standing history of anxiety and was previously seen by a psychiatrist. She had been on Xanax but is no longer on this. She had been on Lexapro with great improvement. Although, when she began taking Lexapro again she had suicidal thoughts so this had been changed to Zoloft. She continues to see therapist every Monday. On May 17, 2018 she had a  due to it an unplanned pregnancy. She currently works as a nanny. 2 weeks after her  one of the children she babysits had a febrile seizure. This has led to her being constantly on edge. She said this past Tuesday she had for anxiety attacks resulting in her having to leave her work early. Her therapist is currently working on arranging an appointment with her with a psychiatrist. He denies any current suicidal or homicidal ideation. She states she feels that this is largely due to her thyroid. She states when she was last diagnosed with Graves' disease she had similar symptoms. She states that on her apple watch she notes that her heart rate is in the 110s despite her resting. She is currently not tachycardic. Despite her TSH being normal 10 days ago patient is still not satisfied and thinks that this may be due to her Graves' disease.    Patient Active Problem List    Diagnosis Date Noted   • Major depressive disorder 2018   • Graves disease 2017   • Acid reflux 2017   • Anxiety disorder due to general medical condition 2017       Current Outpatient Prescriptions   Medication Sig Dispense Refill   • sertraline (ZOLOFT) 50 MG Tab Take 1 Tab by mouth every day. 30 Tab 1   • busPIRone (BUSPAR) 10 MG Tab Take 1 Tab by mouth 2 times a day. 60 Tab 1   • ALPRAZolam (XANAX) 0.25 MG Tab Take 0.25 mg by mouth at bedtime as needed for Sleep.       No current facility-administered  "medications for this visit.        ROS: As per HPI. Additional pertinent symptoms as noted below.    All others negative    /88   Pulse 92   Temp 36.6 °C (97.9 °F)   Ht 1.626 m (5' 4\")   Wt 69.9 kg (154 lb 3.2 oz)   SpO2 95%   BMI 26.47 kg/m²     Physical Exam   Constitutional:  oriented to person, place, and time. No distress.   Eyes: Pupils are equal, round, and reactive to light. No scleral icterus.  Neck: Neck supple. Slightly enlarged thyroid. Nontender  Cardiovascular: Normal rate, regular rhythm and normal heart sounds.  Exam reveals no gallop and no friction rub.  No murmur heard.  Pulmonary/Chest: Breath sounds normal. Chest wall is not dull to percussion.   Neurological: alert and oriented to person, place, and time. No hand tremor or tongue tremor noted. Normal reflexes  Psychiatry: Patient's mood appears to be on edge    Note: I have reviewed all pertinent labs and diagnostic tests associated with this visit with specific comments listed under the assessment and plan below    Assessment and Plan    1. Graves disease  - Patient had been on methimazole prior to her unplanned pregnancy but then this was stopped by her endocrinologist  - She feels her symptoms may be due to her Graves' disease acting up  - TSH, free T4, T3 ordered    2. Anxiety disorder due to general medical condition  - she will be arranged with an appointment with a psychiatrist soon. She is encouraged to continue seeing her therapist.  - Discussed with patient about increasing her Zoloft from 25 mg to 50 mg  - Patient started on BuSpar 10 mg twice a day    3. Recurrent major depressive disorder, remission status unspecified (HCC)  - Medications as noted above      Followup: Return in about 2 weeks (around 7/5/2018).      Signed by: Dave Barber M.D.    "

## 2018-06-23 LAB
T3 SERPL-MCNC: 121 NG/DL (ref 71–180)
T4 FREE SERPL-MCNC: 1.25 NG/DL (ref 0.82–1.77)
TSH SERPL DL<=0.005 MIU/L-ACNC: 1.32 UIU/ML (ref 0.45–4.5)

## 2018-06-24 NOTE — TELEPHONE ENCOUNTER
On 6/20/18, spoke with patient at length re: Zoloft, most common side effects.  Patient with appt. with Dr. Dave Barber the following day.

## 2018-06-25 ENCOUNTER — TELEPHONE (OUTPATIENT)
Dept: INTERNAL MEDICINE | Facility: MEDICAL CENTER | Age: 28
End: 2018-06-25

## 2018-06-25 NOTE — TELEPHONE ENCOUNTER
DOCUMENTATION OF PAR STATUS:    1. Name of Medication & Dose: sertraline (ZOLOFT) 50 MG Tab    2. Name of Prescription Coverage Company & phone #: [employer]/Cover My Meds      3. Date Prior Auth Submitted: 6/25/18    4. What information was given to obtain insurance decision? Dosage/dx code/prior meds tried    5. Prior Auth Status? Pending    6. Patient Notified: no

## 2018-06-25 NOTE — TELEPHONE ENCOUNTER
PA actually needed to be submitted via paper request from [place]. Needs physician signature. Placed in 's folder.

## 2018-07-05 ENCOUNTER — OFFICE VISIT (OUTPATIENT)
Dept: INTERNAL MEDICINE | Facility: MEDICAL CENTER | Age: 28
End: 2018-07-05
Payer: COMMERCIAL

## 2018-07-05 VITALS
HEIGHT: 64 IN | BODY MASS INDEX: 26.09 KG/M2 | HEART RATE: 80 BPM | OXYGEN SATURATION: 97 % | WEIGHT: 152.8 LBS | TEMPERATURE: 98.4 F | DIASTOLIC BLOOD PRESSURE: 80 MMHG | SYSTOLIC BLOOD PRESSURE: 104 MMHG

## 2018-07-05 DIAGNOSIS — F33.9 RECURRENT MAJOR DEPRESSIVE DISORDER, REMISSION STATUS UNSPECIFIED (HCC): ICD-10-CM

## 2018-07-05 DIAGNOSIS — F06.4 ANXIETY DISORDER DUE TO GENERAL MEDICAL CONDITION: ICD-10-CM

## 2018-07-05 PROCEDURE — 99213 OFFICE O/P EST LOW 20 MIN: CPT | Mod: GE | Performed by: INTERNAL MEDICINE

## 2018-07-05 NOTE — PROGRESS NOTES
"      Established Patient    Alcira presents today with the following:    CC: anxiety, depression     HPI: Ms Encarnacion is a 28 yo female with PMH of anxiety and depression presenting today for follow up. She is taking zoloft 50 mg and it seems to work. She did not start wellbutrin yet. She also sees her therapist every week and her symptoms are getting better. She is moving to [place] on 16 th of this month with her boyfriend. She is concerned that she will run out of her medications before she can get insurance and see a doctor.   She had labs done, ordered by endocrinologist and awaiting to hear the results. Currently she is not on any antithyroid meds.     Patient Active Problem List    Diagnosis Date Noted   • Major depressive disorder 06/11/2018   • Graves disease 08/11/2017   • Acid reflux 08/11/2017   • Anxiety disorder due to general medical condition 08/11/2017       Current Outpatient Prescriptions   Medication Sig Dispense Refill   • sertraline (ZOLOFT) 50 MG Tab Take 1 Tab by mouth every day. 90 Tab 0   • busPIRone (BUSPAR) 10 MG Tab Take 1 Tab by mouth 2 times a day. (Patient not taking: Reported on 7/5/2018) 60 Tab 1     No current facility-administered medications for this visit.        ROS: As per HPI. Additional pertinent symptoms as noted below.      /80   Pulse 80   Temp 36.9 °C (98.4 °F)   Ht 1.626 m (5' 4\")   Wt 69.3 kg (152 lb 12.8 oz)   SpO2 97%   BMI 26.23 kg/m²     Physical Exam   Constitutional:  oriented to person, place, and time. No distress.   Eyes: Pupils are equal, round, and reactive to light. No scleral icterus.  Neck: Neck supple. No thyromegaly present.   Cardiovascular: Normal rate, regular rhythm and normal heart sounds.  Exam reveals no gallop and no friction rub.  No murmur heard.  Pulmonary/Chest: Breath sounds normal. Chest wall is not dull to percussion.   Musculoskeletal:   no edema.   Lymphadenopathy: no cervical adenopathy  Neurological: alert and oriented to " person, place, and time.   Skin: No cyanosis. Nails show no clubbing.    Note: I have reviewed all pertinent labs and diagnostic tests associated with this visit with specific comments listed under the assessment and plan below    Assessment and Plan    1. Anxiety disorder due to general medical condition   2. Recurrent major depressive disorder, remission status unspecified (HCC)  Her symptoms are improved with zoloft 50 mg and she would like to take the same dose.  discussed that there is more room to go up on the dose if needed. We will order a 3 month supply to avoid interruption of medication when moving states.   Recommended to fill wellbutrin and start taking it if her anxiety symptoms are worse.     Followup: As needed, Patient is moving out of town this month    Signed by: Morelia Smyth M.D.

## 2018-07-12 ENCOUNTER — TELEPHONE (OUTPATIENT)
Dept: INTERNAL MEDICINE | Facility: MEDICAL CENTER | Age: 28
End: 2018-07-12

## 2018-07-12 ENCOUNTER — PATIENT OUTREACH (OUTPATIENT)
Dept: HEALTH INFORMATION MANAGEMENT | Facility: OTHER | Age: 28
End: 2018-07-12

## 2018-07-12 NOTE — PROGRESS NOTES
PT STATED SHE WILL BE MOVING OUT OF STATE  UNABLE TO ADDRESS CARE GAPS PT DID NOT VERIFY DEMOGRAPHICS

## 2018-07-12 NOTE — TELEPHONE ENCOUNTER
DOCUMENTATION OF PAR STATUS:    1. Name of Medication & Dose: Sertraline 50mg      2. Name of Prescription Coverage Company & phone #: [employer]     3. Date Prior Auth Submitted: 07/11/18    4. What information was given to obtain insurance decision? Needs O/R to p/u now as she is moving out of state. Needs 90 day supply.     5. Prior Auth Letter Approved or Denied? Denied, insurance will only approve #[id number]    6. Action Taken: Pharmacy/Patient Notified: Yes

## 2019-03-18 RX ORDER — ALPRAZOLAM 0.25 MG/1
TABLET ORAL
Refills: 0 | OUTPATIENT
Start: 2019-03-18